# Patient Record
Sex: MALE | Race: WHITE | NOT HISPANIC OR LATINO | Employment: OTHER | URBAN - NONMETROPOLITAN AREA
[De-identification: names, ages, dates, MRNs, and addresses within clinical notes are randomized per-mention and may not be internally consistent; named-entity substitution may affect disease eponyms.]

---

## 2024-12-27 PROBLEM — E55.9 VITAMIN D DEFICIENCY: Status: ACTIVE | Noted: 2024-12-27

## 2024-12-27 PROBLEM — G89.29 OTHER CHRONIC PAIN: Status: ACTIVE | Noted: 2024-12-27

## 2024-12-27 NOTE — PROGRESS NOTES
HPI:  pt here for routine wellness exam w/o any issues    PMH:   Past Medical History:   Diagnosis Date    Other chronic pain 12/27/2024    Vitamin D deficiency 12/27/2024     MEDICATIONS:   No current outpatient medications on file.     No current facility-administered medications for this visit.     ALLERGIES:  Not on File  SOCIAL HX:     FAMILY HX: No family history on file.    ROS:             CONSTITUTIONAL:          Negative for concerns, fever, chills.         HEENT:          no Difficulty swallowing.  no Hearing loss.  no Poor sense of smell.   No change in vision ; no headaches     CARDIOLOGY:          Chest pain none.  Palpitations none.  Shortness of breath none.         RESPIRATORY:          Negative for persistent cough , wheezing, trouble breathing.         GASTROENTEROLOGY:          Negative for abdominal pain, change in bowel habits.         ENDOCRINOLOGY:          Negative for fatigue, polydipsia, polyuria, weight loss, weight gain, cold intolerance, heat intolerance.         MUSCULOSKELETAL:          Negative for myalgias, joint pain.         DERMATOLOGY:          Negative for rash, bruising, moles.         NEUROLOGY:          Negative for weakness, headache, dizziness.     VITALS: There were no vitals taken for this visit.     PE:  {PHYSICAL EXAM WITH PROVIDER CHOICES:43802}    Diagnoses and all orders for this visit:  Well adult exam (Primary)

## 2024-12-30 NOTE — PROGRESS NOTES
HPI:  pt here to establish and for routine wellness exam w/o any issues.  Last seen by previous pcp in 2021 Em.  Does c.o of ED for a number of  yrs; viagra did not help; no bloodwork done to look into ED.      PMH:   Past Medical History:   Diagnosis Date    Other chronic pain 12/27/2024    Vitamin D deficiency 12/27/2024     MEDICATIONS:   No current outpatient medications on file.     No current facility-administered medications for this visit.     ALLERGIES:  No Known Allergies  SOCIAL HX:    Social History     Tobacco Use    Smoking status: Never    Smokeless tobacco: Never   Vaping Use    Vaping status: Never Used   Substance Use Topics    Alcohol use: Yes    Drug use: Not Currently     FAMILY HX:   Family History   Problem Relation Name Age of Onset    Diabetes Mother      Stroke Father         ROS:             CONSTITUTIONAL:          Negative for concerns, fever, chills.         HEENT:          no Difficulty swallowing.  no Hearing loss.  no Poor sense of smell.   No change in vision ; no headaches     CARDIOLOGY:          Chest pain none.  Palpitations none.  Shortness of breath none.         RESPIRATORY:          Negative for persistent cough , wheezing, trouble breathing.         GASTROENTEROLOGY:          Negative for abdominal pain, change in bowel habits.         ENDOCRINOLOGY:          Negative for fatigue, polydipsia, polyuria, weight loss, weight gain, cold intolerance, heat intolerance.         MUSCULOSKELETAL:          Negative for myalgias, joint pain.         DERMATOLOGY:          Negative for rash, bruising, moles.         NEUROLOGY:          Negative for weakness, headache, dizziness.     VITALS: /84   Pulse 73   Wt 93.3 kg (205 lb 9.6 oz)   SpO2 97%   BMI 29.50 kg/m²      PE:  General Appearance: awake, alert, oriented, in no acute distress  Skin: multiple cysts on face, scalp, thigh, abdomen  Head/Face: NCAT  Eyes: No gross abnormalities., PERRL, and EOMI  Ears: canals  and TMs NI  Mouth/Throat: Mucosa moist, no lesions; pharynx without erythema, edema or exudate.  Neck: neck- supple, no mass, non-tender and Adenopathy- absent  Lungs: Normal expansion.  Clear to auscultation.  No rales, rhonchi, or wheezing.  Heart: Heart sounds are normal.  Regular rate and rhythm without murmur, gallop or rub.  Abdomen: Soft, non-tender, normal bowel sounds; no bruits, organomegaly or masses.  Hernias: umbilical hernia, also either a cyst ventrally or diastasis rectus  Extremities: Extremities warm to touch, pink, with no edema.  Musculoskeletal: Range of motion normal in hips, knees, shoulders, and spine., No joint swelling, deformity, or tenderness.  Neurologic: Alert and oriented x 3, gait normal., reflexes normal and symmetric, strength and  sensation grossly normal    Joey was seen today for establish care and annual exam.  Diagnoses and all orders for this visit:  Well adult exam (Primary)  -     CBC; Future  -     Comprehensive Metabolic Panel; Future  -     Hemoglobin A1C; Future  -     Lipid Panel; Future  -     Prostate Specific Antigen, Screen; Future  -     CBC  -     Comprehensive Metabolic Panel  -     Hemoglobin A1C  -     Lipid Panel  -     Prostate Specific Antigen, Screen  Screening for colorectal cancer  -     Cologuard® colon cancer screening; Future  -     Cologuard® colon cancer screening  Umbilical hernia without obstruction and without gangrene  Comments:  no change in size or discomfort; watch and observe  Erectile dysfunction, unspecified erectile dysfunction type  -     Testosterone; Future  -     Parathyroid Hormone, Intact; Future  -     TSH with reflex to Free T4 if abnormal; Future  -     Testosterone  -     Parathyroid Hormone, Intact  -     TSH with reflex to Free T4 if abnormal  Need for vaccination  -     Tdap vaccine, age 7 years and older  (BOOSTRIX)

## 2025-01-30 ENCOUNTER — OFFICE VISIT (OUTPATIENT)
Dept: PRIMARY CARE | Facility: CLINIC | Age: 65
End: 2025-01-30
Payer: COMMERCIAL

## 2025-01-30 VITALS
SYSTOLIC BLOOD PRESSURE: 126 MMHG | WEIGHT: 205.6 LBS | BODY MASS INDEX: 29.5 KG/M2 | HEART RATE: 73 BPM | DIASTOLIC BLOOD PRESSURE: 84 MMHG | OXYGEN SATURATION: 97 %

## 2025-01-30 DIAGNOSIS — Z12.12 SCREENING FOR COLORECTAL CANCER: ICD-10-CM

## 2025-01-30 DIAGNOSIS — N52.9 ERECTILE DYSFUNCTION, UNSPECIFIED ERECTILE DYSFUNCTION TYPE: ICD-10-CM

## 2025-01-30 DIAGNOSIS — Z23 NEED FOR VACCINATION: ICD-10-CM

## 2025-01-30 DIAGNOSIS — K42.9 UMBILICAL HERNIA WITHOUT OBSTRUCTION AND WITHOUT GANGRENE: ICD-10-CM

## 2025-01-30 DIAGNOSIS — Z12.11 SCREENING FOR COLORECTAL CANCER: ICD-10-CM

## 2025-01-30 DIAGNOSIS — Z00.00 WELL ADULT EXAM: Primary | ICD-10-CM

## 2025-01-30 PROCEDURE — 99203 OFFICE O/P NEW LOW 30 MIN: CPT | Performed by: FAMILY MEDICINE

## 2025-01-30 PROCEDURE — 90715 TDAP VACCINE 7 YRS/> IM: CPT | Performed by: FAMILY MEDICINE

## 2025-01-30 PROCEDURE — 99386 PREV VISIT NEW AGE 40-64: CPT | Performed by: FAMILY MEDICINE

## 2025-01-30 PROCEDURE — 1036F TOBACCO NON-USER: CPT | Performed by: FAMILY MEDICINE

## 2025-01-30 PROCEDURE — 90471 IMMUNIZATION ADMIN: CPT | Performed by: FAMILY MEDICINE

## 2025-01-30 ASSESSMENT — PATIENT HEALTH QUESTIONNAIRE - PHQ9
SUM OF ALL RESPONSES TO PHQ9 QUESTIONS 1 AND 2: 0
1. LITTLE INTEREST OR PLEASURE IN DOING THINGS: NOT AT ALL
2. FEELING DOWN, DEPRESSED OR HOPELESS: NOT AT ALL

## 2025-01-30 ASSESSMENT — PAIN SCALES - GENERAL: PAINLEVEL_OUTOF10: 0-NO PAIN

## 2025-01-31 ENCOUNTER — APPOINTMENT (OUTPATIENT)
Dept: PRIMARY CARE | Facility: CLINIC | Age: 65
End: 2025-01-31
Payer: COMMERCIAL

## 2025-01-31 DIAGNOSIS — Z00.00 WELL ADULT EXAM: Primary | ICD-10-CM

## 2025-01-31 PROBLEM — E78.5 HYPERLIPIDEMIA: Status: ACTIVE | Noted: 2025-01-31

## 2025-01-31 PROBLEM — R73.03 PREDIABETES: Status: ACTIVE | Noted: 2025-01-31

## 2025-01-31 LAB
ALBUMIN SERPL-MCNC: 4.8 G/DL (ref 3.6–5.1)
ALP SERPL-CCNC: 58 U/L (ref 35–144)
ALT SERPL-CCNC: 33 U/L (ref 9–46)
ANION GAP SERPL CALCULATED.4IONS-SCNC: 7 MMOL/L (CALC) (ref 7–17)
AST SERPL-CCNC: 23 U/L (ref 10–35)
BILIRUB SERPL-MCNC: 0.5 MG/DL (ref 0.2–1.2)
BUN SERPL-MCNC: 20 MG/DL (ref 7–25)
CALCIUM SERPL-MCNC: 9.7 MG/DL (ref 8.6–10.3)
CHLORIDE SERPL-SCNC: 103 MMOL/L (ref 98–110)
CHOLEST SERPL-MCNC: 226 MG/DL
CHOLEST/HDLC SERPL: 4.2 (CALC)
CO2 SERPL-SCNC: 28 MMOL/L (ref 20–32)
CREAT SERPL-MCNC: 1.24 MG/DL (ref 0.7–1.35)
EGFRCR SERPLBLD CKD-EPI 2021: 65 ML/MIN/1.73M2
ERYTHROCYTE [DISTWIDTH] IN BLOOD BY AUTOMATED COUNT: 12.3 % (ref 11–15)
EST. AVERAGE GLUCOSE BLD GHB EST-MCNC: 120 MG/DL
EST. AVERAGE GLUCOSE BLD GHB EST-SCNC: 6.6 MMOL/L
GLUCOSE SERPL-MCNC: 87 MG/DL (ref 65–99)
HBA1C MFR BLD: 5.8 % OF TOTAL HGB
HCT VFR BLD AUTO: 47 % (ref 38.5–50)
HDLC SERPL-MCNC: 54 MG/DL
HGB BLD-MCNC: 15.2 G/DL (ref 13.2–17.1)
LDLC SERPL CALC-MCNC: 153 MG/DL (CALC)
MCH RBC QN AUTO: 28.6 PG (ref 27–33)
MCHC RBC AUTO-ENTMCNC: 32.3 G/DL (ref 32–36)
MCV RBC AUTO: 88.3 FL (ref 80–100)
NONHDLC SERPL-MCNC: 172 MG/DL (CALC)
PLATELET # BLD AUTO: 341 THOUSAND/UL (ref 140–400)
PMV BLD REES-ECKER: 10.1 FL (ref 7.5–12.5)
POTASSIUM SERPL-SCNC: 4.8 MMOL/L (ref 3.5–5.3)
PROT SERPL-MCNC: 7 G/DL (ref 6.1–8.1)
PSA SERPL-MCNC: 0.46 NG/ML
PTH-INTACT SERPL-MCNC: 21 PG/ML (ref 16–77)
RBC # BLD AUTO: 5.32 MILLION/UL (ref 4.2–5.8)
SODIUM SERPL-SCNC: 138 MMOL/L (ref 135–146)
TESTOST SERPL-MCNC: 669 NG/DL (ref 250–827)
TRIGL SERPL-MCNC: 82 MG/DL
TSH SERPL-ACNC: 4.21 MIU/L (ref 0.4–4.5)
WBC # BLD AUTO: 4.7 THOUSAND/UL (ref 3.8–10.8)

## 2025-02-12 LAB — NONINV COLON CA DNA+OCC BLD SCRN STL QL: NEGATIVE
